# Patient Record
Sex: MALE | Race: WHITE | NOT HISPANIC OR LATINO
[De-identification: names, ages, dates, MRNs, and addresses within clinical notes are randomized per-mention and may not be internally consistent; named-entity substitution may affect disease eponyms.]

---
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
## 2022-09-14 NOTE — ED PROVIDER NOTE - NS ED MD EM SELECTION
impaired balance/narrow base of support/impaired postural control/decreased strength
[address]263 Exp Problem Focused - Mod. Complex

## 2023-03-20 ENCOUNTER — APPOINTMENT (OUTPATIENT)
Dept: ORTHOPEDIC SURGERY | Facility: CLINIC | Age: 23
End: 2023-03-20
Payer: COMMERCIAL

## 2023-03-20 VITALS — HEIGHT: 72 IN | BODY MASS INDEX: 20.32 KG/M2 | WEIGHT: 150 LBS

## 2023-03-20 DIAGNOSIS — M25.572 PAIN IN LEFT ANKLE AND JOINTS OF LEFT FOOT: ICD-10-CM

## 2023-03-20 PROCEDURE — 99203 OFFICE O/P NEW LOW 30 MIN: CPT

## 2023-03-20 NOTE — HISTORY OF PRESENT ILLNESS
[de-identified] : Pt is a 21 y/o male with left ankle pain secondary to an injury sustained while in [place] on 03/16/2023. He reports to have tripped and fell down stairs, resulting in his left ankle inverting. He was seen at a local ED in [place], where xrays were obtained. He was splinted and advised to follow up with a specialist. He has continued pain, notably upon waking in the morning and with walking. \par \par He has a history of a benign tumor excision to his left foot in 2015.

## 2023-03-20 NOTE — DISCUSSION/SUMMARY
[de-identified] : The underlying pathophysiology was reviewed with the patient. XR films were reviewed with the patient. Discussed at length the nature of the patient’s condition. The left ankle symptoms appear secondary to ATFL sprain.\par \par At this time, he was fitted with an air cast brace and rigid soled post-op walking shoe to support and protection. He may WBAT, with the assistance of crutches and slowly transition to one crutch and then a cane, once he can tolerate it. I also advised Achilles tendon stretching as well as gentle ROM exercises of the foot and ankle. He was instructed on activity modification, elevation to reduce edema and use of oral antiinflammatories as needed for pain. \par \par All questions answered, understanding verbalized. Patient in agreement with plan of care. Follow up in 4 weeks for reassessment, if needed.

## 2023-03-20 NOTE — PHYSICAL EXAM
[de-identified] : Patient is WDWN, alert, and in no acute distress. Breathing is unlabored. He is grossly oriented to person, place, and time.\par \par He is accompanied by his father today.\par \par Left Ankle:\par He presents to the office ambulating, with the assistance of crutches.\par Posterior splint in place, which was removed for physical exam. \par He has focal tenderness noted laterally, over the ATFL.\par Moderate edema noted laterally to the ankle.\par He has full ROM of the left ankle, with pain.\par Full arc of motion to the toes.\par Sensation is intact.  [de-identified] : AP, lateral and oblique views of the LEFT ankle were obtained today and revealed no abnormalities. No acute fracture. No dislocation. Cartilage spaces are maintained.

## 2023-03-20 NOTE — END OF VISIT
[FreeTextEntry3] : All medical record entries made by the Scribe were at my,  Dr. Harsh Starr MD., direction and personally dictated by me on 03/20/2023. I have personally reviewed the chart and agree that the record accurately reflects my personal performance of the history, physical exam, assessment and plan.

## 2023-03-20 NOTE — ADDENDUM
[FreeTextEntry1] : I, Debra Saavedra wrote this note acting as a scribe for Dr. Harsh Starr on Mar 20, 2023.

## 2023-06-01 ENCOUNTER — APPOINTMENT (OUTPATIENT)
Dept: PULMONOLOGY | Facility: CLINIC | Age: 23
End: 2023-06-01
Payer: COMMERCIAL

## 2023-06-01 VITALS
BODY MASS INDEX: 19.23 KG/M2 | RESPIRATION RATE: 15 BRPM | TEMPERATURE: 98 F | HEART RATE: 63 BPM | DIASTOLIC BLOOD PRESSURE: 71 MMHG | HEIGHT: 72 IN | WEIGHT: 142 LBS | SYSTOLIC BLOOD PRESSURE: 112 MMHG | OXYGEN SATURATION: 99 %

## 2023-06-01 DIAGNOSIS — J45.990 EXERCISE INDUCED BRONCHOSPASM: ICD-10-CM

## 2023-06-01 PROCEDURE — 99204 OFFICE O/P NEW MOD 45 MIN: CPT

## 2023-06-01 NOTE — ASSESSMENT
[FreeTextEntry1] : Mr. MACIEL IVY is a 22 year old man with history of exercise-induced asthma in childhood is here for evaluation\par Was seen by new PMD and scored "low" on breathing test.  Patient exercises at the gym daily and has no issues.  Lungs clear on exam today\par -- Obtain complete PFTs\par -- Spirometry results requested from PMD\par \par All questions answered. Patient in agreement with plan. \par Follow up after PFTs

## 2023-06-01 NOTE — HISTORY OF PRESENT ILLNESS
[Never] : never [Current] : current [TextBox_4] : Mr. MACIEL IVY is a 22 year old man with history of exercise-induced asthma is here for evaluation\par \par Was seen by PMD and scored "low" on breathing test. \par \par Was dx with exercise induced asthma at age 12/13. Was using albuterol as needed during during sports and school.  He currently exercises the gym about an day, doing both weights and cardio and never has any issues.  Never uses albuterol. Denies cough, wheezing, chest tightness.  Never been on prednisone, denies any asthma exacerbations\par \par ROS: \par has seasonal allergies, no CRS\par denies fevers, chills, night sweats, unintentional weight loss\par denies known autoimmune disease\par \par PMH: left foot osteoid osteoma\par Meds: per chart\par All: NkDA\par SH: has a dog at home\par FH: Denies family hx of pulmonary or autoimmune disease\par PMD: Dr Polanco  - [phone]\par Immunizations: reports UTD with everything\par  [TextBox_22] : occasional [TextBox_27] : occasional, socially

## 2023-06-01 NOTE — CONSULT LETTER
[Dear  ___] : Dear  [unfilled], [Consult Letter:] : I had the pleasure of evaluating your patient, [unfilled]. [Please see my note below.] : Please see my note below. [Consult Closing:] : Thank you very much for allowing me to participate in the care of this patient.  If you have any questions, please do not hesitate to contact me. [FreeTextEntry3] : Sincerely,\par \par Elaina Gong MD\par [facility] Physician [facility]\par Pulmonary Medicine\par tel: [phone]\par fax: [phone]\par